# Patient Record
Sex: FEMALE | Race: BLACK OR AFRICAN AMERICAN | NOT HISPANIC OR LATINO
[De-identification: names, ages, dates, MRNs, and addresses within clinical notes are randomized per-mention and may not be internally consistent; named-entity substitution may affect disease eponyms.]

---

## 2020-10-23 PROBLEM — Z00.00 ENCOUNTER FOR PREVENTIVE HEALTH EXAMINATION: Status: ACTIVE | Noted: 2020-10-23

## 2020-10-26 ENCOUNTER — APPOINTMENT (OUTPATIENT)
Dept: PULMONOLOGY | Facility: CLINIC | Age: 70
End: 2020-10-26

## 2021-04-12 ENCOUNTER — APPOINTMENT (OUTPATIENT)
Dept: ORTHOPEDIC SURGERY | Facility: CLINIC | Age: 71
End: 2021-04-12

## 2021-04-19 ENCOUNTER — APPOINTMENT (OUTPATIENT)
Dept: ORTHOPEDIC SURGERY | Facility: CLINIC | Age: 71
End: 2021-04-19

## 2021-06-03 ENCOUNTER — EMERGENCY (EMERGENCY)
Facility: HOSPITAL | Age: 71
LOS: 1 days | Discharge: HOME | End: 2021-06-03
Attending: EMERGENCY MEDICINE | Admitting: EMERGENCY MEDICINE
Payer: COMMERCIAL

## 2021-06-03 VITALS
RESPIRATION RATE: 18 BRPM | HEIGHT: 68 IN | SYSTOLIC BLOOD PRESSURE: 169 MMHG | OXYGEN SATURATION: 97 % | HEART RATE: 77 BPM | TEMPERATURE: 98 F | DIASTOLIC BLOOD PRESSURE: 84 MMHG | WEIGHT: 240.08 LBS

## 2021-06-03 VITALS
DIASTOLIC BLOOD PRESSURE: 61 MMHG | RESPIRATION RATE: 17 BRPM | OXYGEN SATURATION: 95 % | SYSTOLIC BLOOD PRESSURE: 110 MMHG | TEMPERATURE: 98 F | HEART RATE: 70 BPM

## 2021-06-03 DIAGNOSIS — Z95.0 PRESENCE OF CARDIAC PACEMAKER: ICD-10-CM

## 2021-06-03 DIAGNOSIS — I73.9 PERIPHERAL VASCULAR DISEASE, UNSPECIFIED: ICD-10-CM

## 2021-06-03 DIAGNOSIS — Z86.79 PERSONAL HISTORY OF OTHER DISEASES OF THE CIRCULATORY SYSTEM: ICD-10-CM

## 2021-06-03 DIAGNOSIS — R06.02 SHORTNESS OF BREATH: ICD-10-CM

## 2021-06-03 DIAGNOSIS — I25.10 ATHEROSCLEROTIC HEART DISEASE OF NATIVE CORONARY ARTERY WITHOUT ANGINA PECTORIS: ICD-10-CM

## 2021-06-03 DIAGNOSIS — M79.89 OTHER SPECIFIED SOFT TISSUE DISORDERS: ICD-10-CM

## 2021-06-03 DIAGNOSIS — I44.0 ATRIOVENTRICULAR BLOCK, FIRST DEGREE: ICD-10-CM

## 2021-06-03 DIAGNOSIS — E78.5 HYPERLIPIDEMIA, UNSPECIFIED: ICD-10-CM

## 2021-06-03 DIAGNOSIS — E11.9 TYPE 2 DIABETES MELLITUS WITHOUT COMPLICATIONS: ICD-10-CM

## 2021-06-03 DIAGNOSIS — I25.5 ISCHEMIC CARDIOMYOPATHY: ICD-10-CM

## 2021-06-03 DIAGNOSIS — I10 ESSENTIAL (PRIMARY) HYPERTENSION: ICD-10-CM

## 2021-06-03 DIAGNOSIS — I82.409 ACUTE EMBOLISM AND THROMBOSIS OF UNSPECIFIED DEEP VEINS OF UNSPECIFIED LOWER EXTREMITY: ICD-10-CM

## 2021-06-03 LAB
ALBUMIN SERPL ELPH-MCNC: 4 G/DL — SIGNIFICANT CHANGE UP (ref 3.3–5)
ALP SERPL-CCNC: 79 U/L — SIGNIFICANT CHANGE UP (ref 40–120)
ALT FLD-CCNC: 11 U/L — SIGNIFICANT CHANGE UP (ref 10–45)
ANION GAP SERPL CALC-SCNC: 13 MMOL/L — SIGNIFICANT CHANGE UP (ref 5–17)
APTT BLD: 37.6 SEC — HIGH (ref 27.5–35.5)
AST SERPL-CCNC: 11 U/L — SIGNIFICANT CHANGE UP (ref 10–40)
BASOPHILS # BLD AUTO: 0.03 K/UL — SIGNIFICANT CHANGE UP (ref 0–0.2)
BASOPHILS NFR BLD AUTO: 0.5 % — SIGNIFICANT CHANGE UP (ref 0–2)
BILIRUB SERPL-MCNC: 0.4 MG/DL — SIGNIFICANT CHANGE UP (ref 0.2–1.2)
BUN SERPL-MCNC: 16 MG/DL — SIGNIFICANT CHANGE UP (ref 7–23)
CALCIUM SERPL-MCNC: 9 MG/DL — SIGNIFICANT CHANGE UP (ref 8.4–10.5)
CHLORIDE SERPL-SCNC: 101 MMOL/L — SIGNIFICANT CHANGE UP (ref 96–108)
CO2 SERPL-SCNC: 26 MMOL/L — SIGNIFICANT CHANGE UP (ref 22–31)
CREAT SERPL-MCNC: 0.89 MG/DL — SIGNIFICANT CHANGE UP (ref 0.5–1.3)
EOSINOPHIL # BLD AUTO: 0.28 K/UL — SIGNIFICANT CHANGE UP (ref 0–0.5)
EOSINOPHIL NFR BLD AUTO: 4.3 % — SIGNIFICANT CHANGE UP (ref 0–6)
GLUCOSE SERPL-MCNC: 94 MG/DL — SIGNIFICANT CHANGE UP (ref 70–99)
HCT VFR BLD CALC: 35.1 % — SIGNIFICANT CHANGE UP (ref 34.5–45)
HGB BLD-MCNC: 11.8 G/DL — SIGNIFICANT CHANGE UP (ref 11.5–15.5)
IMM GRANULOCYTES NFR BLD AUTO: 0.2 % — SIGNIFICANT CHANGE UP (ref 0–1.5)
LYMPHOCYTES # BLD AUTO: 2.19 K/UL — SIGNIFICANT CHANGE UP (ref 1–3.3)
LYMPHOCYTES # BLD AUTO: 33.7 % — SIGNIFICANT CHANGE UP (ref 13–44)
MCHC RBC-ENTMCNC: 29.9 PG — SIGNIFICANT CHANGE UP (ref 27–34)
MCHC RBC-ENTMCNC: 33.6 GM/DL — SIGNIFICANT CHANGE UP (ref 32–36)
MCV RBC AUTO: 88.9 FL — SIGNIFICANT CHANGE UP (ref 80–100)
MONOCYTES # BLD AUTO: 0.61 K/UL — SIGNIFICANT CHANGE UP (ref 0–0.9)
MONOCYTES NFR BLD AUTO: 9.4 % — SIGNIFICANT CHANGE UP (ref 2–14)
NEUTROPHILS # BLD AUTO: 3.38 K/UL — SIGNIFICANT CHANGE UP (ref 1.8–7.4)
NEUTROPHILS NFR BLD AUTO: 51.9 % — SIGNIFICANT CHANGE UP (ref 43–77)
NRBC # BLD: 0 /100 WBCS — SIGNIFICANT CHANGE UP (ref 0–0)
PLATELET # BLD AUTO: 225 K/UL — SIGNIFICANT CHANGE UP (ref 150–400)
POTASSIUM SERPL-MCNC: 4.7 MMOL/L — SIGNIFICANT CHANGE UP (ref 3.5–5.3)
POTASSIUM SERPL-SCNC: 4.7 MMOL/L — SIGNIFICANT CHANGE UP (ref 3.5–5.3)
PROT SERPL-MCNC: 6.9 G/DL — SIGNIFICANT CHANGE UP (ref 6–8.3)
RBC # BLD: 3.95 M/UL — SIGNIFICANT CHANGE UP (ref 3.8–5.2)
RBC # FLD: 15.9 % — HIGH (ref 10.3–14.5)
SODIUM SERPL-SCNC: 140 MMOL/L — SIGNIFICANT CHANGE UP (ref 135–145)
TROPONIN T SERPL-MCNC: 0.01 NG/ML — SIGNIFICANT CHANGE UP (ref 0–0.01)
WBC # BLD: 6.5 K/UL — SIGNIFICANT CHANGE UP (ref 3.8–10.5)
WBC # FLD AUTO: 6.5 K/UL — SIGNIFICANT CHANGE UP (ref 3.8–10.5)

## 2021-06-03 PROCEDURE — 71045 X-RAY EXAM CHEST 1 VIEW: CPT

## 2021-06-03 PROCEDURE — 96374 THER/PROPH/DIAG INJ IV PUSH: CPT

## 2021-06-03 PROCEDURE — 93010 ELECTROCARDIOGRAM REPORT: CPT

## 2021-06-03 PROCEDURE — 93971 EXTREMITY STUDY: CPT | Mod: 26,RT

## 2021-06-03 PROCEDURE — 85025 COMPLETE CBC W/AUTO DIFF WBC: CPT

## 2021-06-03 PROCEDURE — G1004: CPT

## 2021-06-03 PROCEDURE — 36415 COLL VENOUS BLD VENIPUNCTURE: CPT

## 2021-06-03 PROCEDURE — 96375 TX/PRO/DX INJ NEW DRUG ADDON: CPT

## 2021-06-03 PROCEDURE — 85730 THROMBOPLASTIN TIME PARTIAL: CPT

## 2021-06-03 PROCEDURE — 99284 EMERGENCY DEPT VISIT MOD MDM: CPT | Mod: 25

## 2021-06-03 PROCEDURE — 84484 ASSAY OF TROPONIN QUANT: CPT

## 2021-06-03 PROCEDURE — 99283 EMERGENCY DEPT VISIT LOW MDM: CPT

## 2021-06-03 PROCEDURE — 93005 ELECTROCARDIOGRAM TRACING: CPT

## 2021-06-03 PROCEDURE — 74174 CTA ABD&PLVS W/CONTRAST: CPT | Mod: 26,MG

## 2021-06-03 PROCEDURE — 80053 COMPREHEN METABOLIC PANEL: CPT

## 2021-06-03 PROCEDURE — 93971 EXTREMITY STUDY: CPT

## 2021-06-03 PROCEDURE — 74174 CTA ABD&PLVS W/CONTRAST: CPT

## 2021-06-03 PROCEDURE — 99285 EMERGENCY DEPT VISIT HI MDM: CPT

## 2021-06-03 PROCEDURE — 71045 X-RAY EXAM CHEST 1 VIEW: CPT | Mod: 26

## 2021-06-03 RX ORDER — HEPARIN SODIUM 5000 [USP'U]/ML
INJECTION INTRAVENOUS; SUBCUTANEOUS
Qty: 25000 | Refills: 0 | Status: DISCONTINUED | OUTPATIENT
Start: 2021-06-03 | End: 2021-06-07

## 2021-06-03 RX ORDER — HEPARIN SODIUM 5000 [USP'U]/ML
4000 INJECTION INTRAVENOUS; SUBCUTANEOUS EVERY 6 HOURS
Refills: 0 | Status: DISCONTINUED | OUTPATIENT
Start: 2021-06-03 | End: 2021-06-07

## 2021-06-03 RX ORDER — MORPHINE SULFATE 50 MG/1
2 CAPSULE, EXTENDED RELEASE ORAL ONCE
Refills: 0 | Status: DISCONTINUED | OUTPATIENT
Start: 2021-06-03 | End: 2021-06-03

## 2021-06-03 RX ORDER — HEPARIN SODIUM 5000 [USP'U]/ML
9000 INJECTION INTRAVENOUS; SUBCUTANEOUS EVERY 6 HOURS
Refills: 0 | Status: DISCONTINUED | OUTPATIENT
Start: 2021-06-03 | End: 2021-06-07

## 2021-06-03 RX ORDER — ACETAMINOPHEN 500 MG
1000 TABLET ORAL ONCE
Refills: 0 | Status: COMPLETED | OUTPATIENT
Start: 2021-06-03 | End: 2021-06-03

## 2021-06-03 RX ADMIN — MORPHINE SULFATE 2 MILLIGRAM(S): 50 CAPSULE, EXTENDED RELEASE ORAL at 20:38

## 2021-06-03 RX ADMIN — Medication 1000 MILLIGRAM(S): at 19:08

## 2021-06-03 RX ADMIN — HEPARIN SODIUM 1900 UNIT(S)/HR: 5000 INJECTION INTRAVENOUS; SUBCUTANEOUS at 19:05

## 2021-06-03 NOTE — CONSULT NOTE ADULT - ASSESSMENT
71F w/ PMHx DM2, HTN, HLD, MI, pacemaker, recent NSTEMI s/p PCA to RCA (9/2020), and known RLE DVT (on eliquis, last taken 6pm last night) presents with increased RLE swelling x 2 weeks. 71F w/ PMHx DM2, HTN, HLD, MI, pacemaker, recent NSTEMI s/p PCA to RCA (9/2020), and known RLE DVT (on eliquis, last taken 6pm last night) presents with increased RLE swelling x 2 weeks. Afebrile, HDS. RLE duplex negative for DVT, CT venogram pending.     -please continue with heparin gtt until CT venogram results  -further plans pending CT venogram  71F w/ PMHx DM2, HTN, HLD, MI, pacemaker, recent NSTEMI s/p PCA to RCA (9/2020), and known RLE DVT (on eliquis, last taken 6pm last night) presents with increased RLE swelling x 2 weeks. Afebrile, HDS. RLE duplex negative for DVT, CT venogram pending.     -please continue with heparin gtt until CT venogram results  -further plans pending CT venogram     -Vascular Amendment  -CTV neg for thrombus.  pt can be dc'd on home Eliquis.  -F/u w/ Dr Orellana prn

## 2021-06-03 NOTE — ED ADULT NURSE REASSESSMENT NOTE - NS ED NURSE REASSESS COMMENT FT1
Reports from SARBJIT Ohara. Pt back from ct. Pending results. Lab and US results reviewed. Pt on Heparin drip at 1900 units per hour. Pt reports pain 8 out of 10 with no change post 1,000 mg of po Tylenol. NEREIDA benavides made aware. Reports from SARBJIT Ohara. Pt back from ct. Pending results and vasculature consult. Lab and US results reviewed. Pt on Heparin drip at 1900 units per hour. Pt reports pain 8 out of 10 with no change post 1,000 mg of po Tylenol. NEREIDA Ocasio made aware. Pt updated on plan of care and verbalized understanding.

## 2021-06-03 NOTE — ED PROVIDER NOTE - PATIENT PORTAL LINK FT
You can access the FollowMyHealth Patient Portal offered by [facility] by registering at the following website: [email or web address]. By joining [facility]’s FollowMyHealth portal, you will also be able to view your health information using other applications (apps) compatible with our system.

## 2021-06-03 NOTE — CONSULT NOTE ADULT - SUBJECTIVE AND OBJECTIVE BOX
71F w/ PMHx DM2, HTN, HLD, MI, pacemaker, recent NSTEMI s/p PCA to RCA (9/2020), and known RLE DVT (on eliquis, last taken 6pm last night) presents with increased RLE swelling x 2 weeks. She has noticed that the swelling has been slowly increasing over the past 2 weeks and it has appeared slightly more red than normal. Not remarkably tender. She also reports some mild shortness of breath for the past 2 days that prompted her to contact her cardiologist (Dr. Liz Turner) who told her to come to the ED for further workup. She denies any fevers, chills, CP, dizziness, lightheadedness, dysuria, constipation, diarrhea, no recent falls or trauma to her RLE. Patient reports that she is planned to have surgery for a "blockage" in her heart.     Vital Signs Last 24 Hrs  T(C): 36.9 (03 Jun 2021 13:41), Max: 36.9 (03 Jun 2021 13:41)  T(F): 98.5 (03 Jun 2021 13:41), Max: 98.5 (03 Jun 2021 13:41)  HR: 77 (03 Jun 2021 13:41) (77 - 77)  BP: 169/84 (03 Jun 2021 13:41) (169/84 - 169/84)  BP(mean): --  RR: 18 (03 Jun 2021 13:41) (18 - 18)  SpO2: 97% (03 Jun 2021 13:41) (97% - 97%)    Physical Exam:  General: NAD  Pulmonary: Nonlabored breathing, no respiratory distress  Cardiovascular: regular rate and rhythm, no murmurs   Abdominal: soft, nondistended, nontender with no rebound or guarding  Extremities: WWP, Right leg with increased swelling to the thigh, slightly more erythematous than the left, biphasic DP and PT bilaterally   Neuro: A/O x3    I&O's Summary      LABS:                        11.8   6.50  )-----------( 225      ( 03 Jun 2021 14:33 )             35.1     06-03    140  |  101  |  16  ----------------------------<  94  4.7   |  26  |  0.89    Ca    9.0      03 Jun 2021 14:33    TPro  6.9  /  Alb  4.0  /  TBili  0.4  /  DBili  x   /  AST  11  /  ALT  11  /  AlkPhos  79  06-03        CAPILLARY BLOOD GLUCOSE        LIVER FUNCTIONS - ( 03 Jun 2021 14:33 )  Alb: 4.0 g/dL / Pro: 6.9 g/dL / ALK PHOS: 79 U/L / ALT: 11 U/L / AST: 11 U/L / GGT: x             RADIOLOGY & ADDITIONAL STUDIES:  < from: US Duplex Venous Lower Ext Ltd, Right (06.03.21 @ 18:17) >  FINDINGS:  Limited visualization of some of the veins secondary to body habitus.    There is normal compressibility of the right common femoral, femoral and popliteal veins.  The contralateral common femoral vein is patent.  Doppler examination shows normal spontaneous and phasic flow.    No calf vein thrombosis is detected.    IMPRESSION:  No evidence of right lower extremity deep venous thrombosis.          Thank you for the opportunity to participate in the care of this patient.  ******PRELIMINARY REPORT******    ******PRELIMINARY REPORT******         < end of copied text >

## 2021-06-03 NOTE — ED PROVIDER NOTE - CARE PROVIDER_API CALL
Dmitry Orellana)  [facility] Surgery  Vascular  [address]  [place], [place] [address]  Phone: [phone]  Fax: [phone]  Follow Up Time:

## 2021-06-03 NOTE — ED PROVIDER NOTE - PMH
DVT (deep venous thrombosis)    HLD (hyperlipidemia)    HTN (hypertension)    MI (myocardial infarction)    Pacemaker    T2DM (type 2 diabetes mellitus)

## 2021-06-03 NOTE — ED ADULT NURSE NOTE - INTERVENTIONS DEFINITIONS
Instruct patient to call for assistance/Stretcher in lowest position, wheels locked, appropriate side rails in place/Provide visual cue, wrist band, yellow gown, etc.

## 2021-06-03 NOTE — ED PROVIDER NOTE - OBJECTIVE STATEMENT
72 y/o F PMHx T2dm, htn, hld, MI, pacemaker, dvt. Presents with sob x 2 days and RLE x 1.5 weeks. Sent to ED by Cardiologist Dr. Liz Turner ([phone], [phone]) for evaluation of swelling to the RLE. Pt reports swelling x 2 weeks.  Pt is planned to have surgery for a "blockage" in her heart. Denies falls.    Pt does not have Hx covid and is fully vaccinated.

## 2021-06-03 NOTE — ED PROVIDER NOTE - PROGRESS NOTE DETAILS
case discussed with DR. Turner, he sent pt to the ED to r/o DVT RLE. wants vascular consult with ? Dr. Ordonez.  pt with RCA stent and known stable circ. stenosis spoke with vascular team will start heparin drip. vascular study pending. CTA neg for clot.  Resting comfortably. Vasc surg clear for DC.

## 2021-06-03 NOTE — ED ADULT NURSE NOTE - OBJECTIVE STATEMENT
Pt AOX4. Pt c/o right leg swelling and pain for 2 weeks and worsening SOB with ambulation that started yesterday. Pt reports hx of DVTS and recent pacemaker placement. Right lower extremity swollen and warm to touch. Pt reports pain in right lower extremity when ambulating. Pt denies fevers, chills, n/v, numbness, weakness, tingling, chest pain, dizziness. +2 pedal pulses b/l. Pt speaking in full complete sentences. Respirations even and unlabored.

## 2021-06-03 NOTE — ED PROVIDER NOTE - NSFOLLOWUPINSTRUCTIONS_ED_ALL_ED_FT
Continue taking Eliquis as prescribed.  Follow up with primary care provider in 1-2 days.  Please also follow up with vascular surgeon.  Return to the Emergency Department if you have any new or worsening symptoms, or if you have any concerns.  ==========================    Edema    WHAT YOU NEED TO KNOW:    Edema is swelling throughout your body. Edema is usually a sign that you are retaining fluid. The swelling may be caused by heart failure or kidney, thyroid, or liver disease. It may also be caused by medicines such as antidepressants, blood pressure medicines, or hormones. Sudden swelling around the lips or face may be a sign of a severe allergic reaction. Swelling of an arm or leg may be caused by blockage of your veins.     DISCHARGE INSTRUCTIONS:    Return to the emergency department if:   •You have shortness of breath at rest, especially when you lie down.      •You cough up pink, foamy sputum.       •You have chest pain.      •Your heartbeat is fast or uneven.       Contact your healthcare provider if:   •The swollen area feels cold and is pale or blue in color.      •The swollen area feels warm, painful, and is red in color.      •You have increased swelling or swelling in other parts of your body.      •You have questions or concerns about your condition or care.      Medicines:   •Medicines help to get rid of extra body fluid.       •Take your medicine as directed. Contact your healthcare provider if you think your medicine is not helping or if you have side effects. Tell him or her if you are allergic to any medicine. Keep a list of the medicines, vitamins, and herbs you take. Include the amounts, and when and why you take them. Bring the list or the pill bottles to follow-up visits. Carry your medicine list with you in case of an emergency.      Follow up with your healthcare provider as directed: Write down your questions so you remember to ask them during your visits.     Manage edema:   •Elevate your arms or legs as directed. Raise them above the level of your heart as often as you can. This will help decrease swelling and pain. Prop them on pillows or blankets to keep them elevated comfortably.      •Wear pressure stockings as directed. The stockings are tight and put pressure on your legs. This helps to keep fluid from collecting in your legs or ankles.       •Limit your salt intake. Salt causes your body to hold water. Ask about any other changes to your diet.      •Stay active. Do not stand or sit for long periods of time. Ask your healthcare provider about the best exercise plan for you.      •Keep your skin moist using lotion, cream, or ointment. Ask your healthcare provider what to use and how often to use it.

## 2021-06-03 NOTE — ED PROVIDER NOTE - MUSCULOSKELETAL, MLM
Spine appears normal, range of motion is not limited, no muscle or joint tenderness. Right leg is warm and swollen compared to the left. 2+ pitting edema. Palpable posterior tibial pulse on the right foot.

## 2021-06-07 VITALS
HEART RATE: 68 BPM | DIASTOLIC BLOOD PRESSURE: 61 MMHG | OXYGEN SATURATION: 94 % | TEMPERATURE: 98 F | RESPIRATION RATE: 18 BRPM | HEIGHT: 68 IN | SYSTOLIC BLOOD PRESSURE: 135 MMHG | WEIGHT: 220.02 LBS

## 2021-06-07 RX ORDER — CHLORHEXIDINE GLUCONATE 213 G/1000ML
1 SOLUTION TOPICAL ONCE
Refills: 0 | Status: DISCONTINUED | OUTPATIENT
Start: 2021-06-09 | End: 2021-06-10

## 2021-06-07 NOTE — H&P ADULT - NSICDXPASTMEDICALHX_GEN_ALL_CORE_FT
PAST MEDICAL HISTORY:  CAD (coronary artery disease)     DVT (deep venous thrombosis)     HLD (hyperlipidemia)     HTN (hypertension)     Ischemic cardiomyopathy     MI (myocardial infarction)     Non-ST elevation MI (NSTEMI)     Pacemaker     PVD (peripheral vascular disease)     T2DM (type 2 diabetes mellitus)

## 2021-06-07 NOTE — H&P ADULT - ADDITIONAL PE
EKG:  ASA: III  Mallampati: II EKG: NSR @ 61 bpm, no acute ST-T wave changes.   ASA: III  Mallampati: II

## 2021-06-07 NOTE — H&P ADULT - HISTORY OF PRESENT ILLNESS
COVID:    70yo Female, former smoker, w/ PMH HTN, HLD, CAD c/b NSTEMI 9/11/20 s/p PCI (NADIA mRCA with residual OM3 80-90%), ICMP (now recovered EF from 35% to 55%), s/p PPM 2/2 ___, prior hx of mod MR now mild, DM-II, PVD and OA, who initially presented to her cardiologist Dr. Liz Turner c/o gradually worsening intermittent substernal, non radiating, chest pressure occurring w/ mod exertion and relieved w/ rest over the past few weeks. Pt also reports MELO w/ light physical exertion and B/L moderate calf pain/heaviness over the past few years that improves w/ rest. She denies any ___ palpitations, dizziness, syncope, diaphoresis, fatigue, LE edema, orthopnea, PND, N/V, fever, chills or recent sick contact. Most recent Cardiac Cath (9/11/20 at [facility]) revealed LM normal, mRCA 80-90% s/p NADIA, dRCA mild diffuse, pLAD mild diffuse, mLAD <30%, dLCx mild diffuse, OM3 80-90%. Pt previously advised for staged PCI of LCx, however has been postponed 2/2 frequent admissions for bradycardia/diarrhea/CHF exacerbation. Echo (2/9/21) EF 55%, mild MR, mild TR  In light of pts risk factors, CCS class __ anginal symptoms and known residual CAD, pt now presents to [facility] for staged PCI. Cardiologist: Dr. Liz Turner   COVID: vaccinated with Moderna, last vaccine 04/07/2021  Pharmacy: [facility] [phone]  Escort: Friend     72yo Female, former smoker, w/ PMH HTN, HLD, CAD c/b NSTEMI 9/11/20 s/p PCI (NADIA mRCA with residual OM3 80-90%), ICMP (now recovered EF from 35% to 55%), s/p PPM 2/2 bradycardia last interrogated: 05/2021, prior hx of mod MR now mild, DM-II, PVD and OA, who initially presented to her cardiologist Dr. Liz Turner c/o gradually worsening intermittent substernal, non radiating, chest pressure occurring w/ mod exertion and relieved w/ rest over the past few weeks. Pt also reports MELO w/ light physical exertion and B/L moderate calf pain/heaviness over the past few years that improves w/ rest. She denies any palpitations, dizziness, syncope, diaphoresis, fatigue, LE edema, orthopnea, PND, N/V, fever, chills or recent sick contact. Most recent Cardiac Cath (9/11/20 at [facility]) revealed LM normal, mRCA 80-90% s/p NADIA, dRCA mild diffuse, pLAD mild diffuse, mLAD <30%, dLCx mild diffuse, OM3 80-90%. Pt previously advised for staged PCI of LCx, however has been postponed 2/2 frequent admissions for bradycardia/diarrhea/CHF exacerbation. Echo (2/9/21) EF 55%, mild MR, mild TR  In light of pts risk factors, CCS class III anginal symptoms and known residual CAD, pt now presents to [facility] for staged PCI. Cardiologist: Dr. Liz Turner   COVID: vaccinated with Moderna, last vaccine 04/07/2021  Pharmacy: [facility] [phone]  Escort: Friend     70yo Female, former smoker, w/ PMH HTN, HLD, CAD c/b NSTEMI 9/11/20 s/p PCI (NADIA mRCA with residual OM3 80-90%), ICMP (now recovered EF from 35% to 55%), s/p PPM 2/2 bradycardia last interrogated: 05/2021, prior hx of mod MR now mild, DM-II, PVD OA, recent DVT 09/2020 - on Xarelto) who initially presented to her cardiologist Dr. Liz Turner c/o gradually worsening intermittent substernal, non radiating, chest pressure occurring w/ mod exertion and relieved w/ rest over the past few weeks. Pt also reports MELO w/ light physical exertion and B/L moderate calf pain/heaviness over the past few years that improves w/ rest. She denies any palpitations, dizziness, syncope, diaphoresis, fatigue, LE edema, orthopnea, PND, N/V, fever, chills or recent sick contact. Most recent Cardiac Cath (9/11/20 at [facility]) revealed LM normal, mRCA 80-90% s/p NADIA, dRCA mild diffuse, pLAD mild diffuse, mLAD <30%, dLCx mild diffuse, OM3 80-90%. Pt previously advised for staged PCI of LCx, however has been postponed 2/2 frequent admissions for bradycardia/diarrhea/CHF exacerbation. Echo (2/9/21) EF 55%, mild MR, mild TR  In light of pts risk factors, CCS class III anginal symptoms and known residual CAD, pt now presents to [facility] for staged PCI.

## 2021-06-07 NOTE — H&P ADULT - ASSESSMENT
70yo Female, former smoker, w/ PMH HTN, HLD, CAD c/b NSTEMI 9/11/20 s/p PCI (NADIA mRCA with residual OM3 80-90%), ICMP (now recovered EF from 35% to 55%), s/p PPM 2/2 bradycardia last interrogated: 05/2021, prior hx of mod MR now mild, DM-II, PVD and OA, who presents for staged PCI given patient's risk factors, CCS class III anginal symptoms and known residual CAD.      - EKG: NSR @      - ASA:  III      Mallampati: III  - H/H stable: 12.1/36.3       Platelets/Coags stable. Cr: 0.89  - Patient denies hematochieza, hematuria, easy bruising, or signs of bleeding.   - Patient loaded with Plavix 600 mg PO x 1 and took Aspirin 81 mg PO x 1 this morning.   - Patient started on IV NS @ 50 cc/hr x 4 hours.   - Patient with hx of DM, on Januvia and Metformin at home. Started on: MISS.   - Patient is a suitable candidate for moderate sedation.     Risks & benefits of procedure and alternative therapy have been explained to the patient including but not limited to: allergic reaction, bleeding w/possible need for blood transfusion, infection, renal and vascular compromise, limb damage, arrhythmia, stroke, vessel dissection/perforation, Myocardial infarction, emergent CABG. Informed consent obtained and in chart.    70yo Female, former smoker, w/ PMH HTN, HLD, CAD c/b NSTEMI 9/11/20 s/p PCI (NADIA mRCA with residual OM3 80-90%), ICMP (now recovered EF from 35% to 55%), s/p PPM 2/2 bradycardia last interrogated: 05/2021, prior hx of mod MR now mild, DM-II, PVD and OA, who presents for staged PCI given patient's risk factors, CCS class III anginal symptoms and known residual CAD.      - EKG: NSR @ 61 bpm, no acute ST-T wave changes.     - ASA:  III      Mallampati: III  - H/H stable: 12.1/36.3       Platelets/Coags stable. Cr: 0.89  - Patient denies hematochieza, hematuria, easy bruising, or signs of bleeding.   - Patient loaded with Plavix 600 mg PO x 1 and took Aspirin 81 mg PO x 1 this morning.   - Patient started on IV NS @ 50 cc/hr x 4 hours.   - Patient with hx of DM, on Januvia and Metformin at home. Started on: MISS.   - Patient is a suitable candidate for moderate sedation.     Risks & benefits of procedure and alternative therapy have been explained to the patient including but not limited to: allergic reaction, bleeding w/possible need for blood transfusion, infection, renal and vascular compromise, limb damage, arrhythmia, stroke, vessel dissection/perforation, Myocardial infarction, emergent CABG. Informed consent obtained and in chart.

## 2021-06-09 ENCOUNTER — INPATIENT (INPATIENT)
Facility: HOSPITAL | Age: 71
LOS: 0 days | Discharge: HOME | DRG: 247 | End: 2021-06-10
Attending: INTERNAL MEDICINE | Admitting: INTERNAL MEDICINE
Payer: COMMERCIAL

## 2021-06-09 ENCOUNTER — TRANSCRIPTION ENCOUNTER (OUTPATIENT)
Age: 71
End: 2021-06-09

## 2021-06-09 DIAGNOSIS — Z79.82 LONG TERM (CURRENT) USE OF ASPIRIN: ICD-10-CM

## 2021-06-09 DIAGNOSIS — E78.5 HYPERLIPIDEMIA, UNSPECIFIED: ICD-10-CM

## 2021-06-09 DIAGNOSIS — I25.110 ATHEROSCLEROTIC HEART DISEASE OF NATIVE CORONARY ARTERY WITH UNSTABLE ANGINA PECTORIS: ICD-10-CM

## 2021-06-09 DIAGNOSIS — I25.2 OLD MYOCARDIAL INFARCTION: ICD-10-CM

## 2021-06-09 DIAGNOSIS — Z86.718 PERSONAL HISTORY OF OTHER VENOUS THROMBOSIS AND EMBOLISM: ICD-10-CM

## 2021-06-09 DIAGNOSIS — Z79.01 LONG TERM (CURRENT) USE OF ANTICOAGULANTS: ICD-10-CM

## 2021-06-09 DIAGNOSIS — I50.9 HEART FAILURE, UNSPECIFIED: ICD-10-CM

## 2021-06-09 DIAGNOSIS — Z95.5 PRESENCE OF CORONARY ANGIOPLASTY IMPLANT AND GRAFT: ICD-10-CM

## 2021-06-09 DIAGNOSIS — I11.0 HYPERTENSIVE HEART DISEASE WITH HEART FAILURE: ICD-10-CM

## 2021-06-09 DIAGNOSIS — Z87.891 PERSONAL HISTORY OF NICOTINE DEPENDENCE: ICD-10-CM

## 2021-06-09 DIAGNOSIS — Z95.0 PRESENCE OF CARDIAC PACEMAKER: ICD-10-CM

## 2021-06-09 DIAGNOSIS — Z79.84 LONG TERM (CURRENT) USE OF ORAL HYPOGLYCEMIC DRUGS: ICD-10-CM

## 2021-06-09 DIAGNOSIS — E83.42 HYPOMAGNESEMIA: ICD-10-CM

## 2021-06-09 DIAGNOSIS — E11.51 TYPE 2 DIABETES MELLITUS WITH DIABETIC PERIPHERAL ANGIOPATHY WITHOUT GANGRENE: ICD-10-CM

## 2021-06-09 DIAGNOSIS — I25.5 ISCHEMIC CARDIOMYOPATHY: ICD-10-CM

## 2021-06-09 LAB
A1C WITH ESTIMATED AVERAGE GLUCOSE RESULT: 6.1 % — HIGH (ref 4–5.6)
ALBUMIN SERPL ELPH-MCNC: 3.9 G/DL — SIGNIFICANT CHANGE UP (ref 3.3–5)
ALP SERPL-CCNC: 76 U/L — SIGNIFICANT CHANGE UP (ref 40–120)
ALT FLD-CCNC: 7 U/L — LOW (ref 10–45)
ANION GAP SERPL CALC-SCNC: 11 MMOL/L — SIGNIFICANT CHANGE UP (ref 5–17)
AST SERPL-CCNC: 10 U/L — SIGNIFICANT CHANGE UP (ref 10–40)
BASOPHILS # BLD AUTO: 0.04 K/UL — SIGNIFICANT CHANGE UP (ref 0–0.2)
BASOPHILS NFR BLD AUTO: 0.6 % — SIGNIFICANT CHANGE UP (ref 0–2)
BILIRUB SERPL-MCNC: 0.5 MG/DL — SIGNIFICANT CHANGE UP (ref 0.2–1.2)
BUN SERPL-MCNC: 11 MG/DL — SIGNIFICANT CHANGE UP (ref 7–23)
CALCIUM SERPL-MCNC: 9.1 MG/DL — SIGNIFICANT CHANGE UP (ref 8.4–10.5)
CHLORIDE SERPL-SCNC: 104 MMOL/L — SIGNIFICANT CHANGE UP (ref 96–108)
CHOLEST SERPL-MCNC: 167 MG/DL — SIGNIFICANT CHANGE UP
CK MB CFR SERPL CALC: 1.2 NG/ML — SIGNIFICANT CHANGE UP (ref 0–6.7)
CK SERPL-CCNC: 75 U/L — SIGNIFICANT CHANGE UP (ref 25–170)
CO2 SERPL-SCNC: 27 MMOL/L — SIGNIFICANT CHANGE UP (ref 22–31)
CREAT SERPL-MCNC: 0.82 MG/DL — SIGNIFICANT CHANGE UP (ref 0.5–1.3)
EOSINOPHIL # BLD AUTO: 0.24 K/UL — SIGNIFICANT CHANGE UP (ref 0–0.5)
EOSINOPHIL NFR BLD AUTO: 3.6 % — SIGNIFICANT CHANGE UP (ref 0–6)
ESTIMATED AVERAGE GLUCOSE: 128 MG/DL — HIGH (ref 68–114)
GLUCOSE BLDC GLUCOMTR-MCNC: 107 MG/DL — HIGH (ref 70–99)
GLUCOSE BLDC GLUCOMTR-MCNC: 89 MG/DL — SIGNIFICANT CHANGE UP (ref 70–99)
GLUCOSE BLDC GLUCOMTR-MCNC: 96 MG/DL — SIGNIFICANT CHANGE UP (ref 70–99)
GLUCOSE SERPL-MCNC: 95 MG/DL — SIGNIFICANT CHANGE UP (ref 70–99)
HCT VFR BLD CALC: 36.3 % — SIGNIFICANT CHANGE UP (ref 34.5–45)
HDLC SERPL-MCNC: 56 MG/DL — SIGNIFICANT CHANGE UP
HGB BLD-MCNC: 12.1 G/DL — SIGNIFICANT CHANGE UP (ref 11.5–15.5)
IMM GRANULOCYTES NFR BLD AUTO: 0.2 % — SIGNIFICANT CHANGE UP (ref 0–1.5)
LIPID PNL WITH DIRECT LDL SERPL: 94 MG/DL — SIGNIFICANT CHANGE UP
LYMPHOCYTES # BLD AUTO: 2.39 K/UL — SIGNIFICANT CHANGE UP (ref 1–3.3)
LYMPHOCYTES # BLD AUTO: 36.2 % — SIGNIFICANT CHANGE UP (ref 13–44)
MCHC RBC-ENTMCNC: 29.7 PG — SIGNIFICANT CHANGE UP (ref 27–34)
MCHC RBC-ENTMCNC: 33.3 GM/DL — SIGNIFICANT CHANGE UP (ref 32–36)
MCV RBC AUTO: 89.2 FL — SIGNIFICANT CHANGE UP (ref 80–100)
MONOCYTES # BLD AUTO: 0.59 K/UL — SIGNIFICANT CHANGE UP (ref 0–0.9)
MONOCYTES NFR BLD AUTO: 8.9 % — SIGNIFICANT CHANGE UP (ref 2–14)
NEUTROPHILS # BLD AUTO: 3.34 K/UL — SIGNIFICANT CHANGE UP (ref 1.8–7.4)
NEUTROPHILS NFR BLD AUTO: 50.5 % — SIGNIFICANT CHANGE UP (ref 43–77)
NON HDL CHOLESTEROL: 111 MG/DL — SIGNIFICANT CHANGE UP
NRBC # BLD: 0 /100 WBCS — SIGNIFICANT CHANGE UP (ref 0–0)
PLATELET # BLD AUTO: 206 K/UL — SIGNIFICANT CHANGE UP (ref 150–400)
POTASSIUM SERPL-MCNC: 4.4 MMOL/L — SIGNIFICANT CHANGE UP (ref 3.5–5.3)
POTASSIUM SERPL-SCNC: 4.4 MMOL/L — SIGNIFICANT CHANGE UP (ref 3.5–5.3)
PROT SERPL-MCNC: 6.8 G/DL — SIGNIFICANT CHANGE UP (ref 6–8.3)
RBC # BLD: 4.07 M/UL — SIGNIFICANT CHANGE UP (ref 3.8–5.2)
RBC # FLD: 15.6 % — HIGH (ref 10.3–14.5)
SODIUM SERPL-SCNC: 142 MMOL/L — SIGNIFICANT CHANGE UP (ref 135–145)
TRIGL SERPL-MCNC: 85 MG/DL — SIGNIFICANT CHANGE UP
WBC # BLD: 6.61 K/UL — SIGNIFICANT CHANGE UP (ref 3.8–10.5)
WBC # FLD AUTO: 6.61 K/UL — SIGNIFICANT CHANGE UP (ref 3.8–10.5)

## 2021-06-09 PROCEDURE — 93010 ELECTROCARDIOGRAM REPORT: CPT | Mod: 76

## 2021-06-09 RX ORDER — INSULIN LISPRO 100/ML
VIAL (ML) SUBCUTANEOUS
Refills: 0 | Status: DISCONTINUED | OUTPATIENT
Start: 2021-06-09 | End: 2021-06-10

## 2021-06-09 RX ORDER — SODIUM CHLORIDE 9 MG/ML
1000 INJECTION, SOLUTION INTRAVENOUS
Refills: 0 | Status: DISCONTINUED | OUTPATIENT
Start: 2021-06-09 | End: 2021-06-10

## 2021-06-09 RX ORDER — ALBUTEROL 90 UG/1
1 AEROSOL, METERED ORAL ONCE
Refills: 0 | Status: DISCONTINUED | OUTPATIENT
Start: 2021-06-09 | End: 2021-06-10

## 2021-06-09 RX ORDER — ATORVASTATIN CALCIUM 80 MG/1
40 TABLET, FILM COATED ORAL AT BEDTIME
Refills: 0 | Status: DISCONTINUED | OUTPATIENT
Start: 2021-06-09 | End: 2021-06-10

## 2021-06-09 RX ORDER — DEXTROSE 50 % IN WATER 50 %
15 SYRINGE (ML) INTRAVENOUS ONCE
Refills: 0 | Status: DISCONTINUED | OUTPATIENT
Start: 2021-06-09 | End: 2021-06-10

## 2021-06-09 RX ORDER — ALBUTEROL 90 UG/1
2 AEROSOL, METERED ORAL
Qty: 0 | Refills: 0 | DISCHARGE

## 2021-06-09 RX ORDER — DEXTROSE 50 % IN WATER 50 %
25 SYRINGE (ML) INTRAVENOUS ONCE
Refills: 0 | Status: DISCONTINUED | OUTPATIENT
Start: 2021-06-09 | End: 2021-06-10

## 2021-06-09 RX ORDER — DEXTROSE 50 % IN WATER 50 %
25 SYRINGE (ML) INTRAVENOUS ONCE
Refills: 0 | Status: DISCONTINUED | OUTPATIENT
Start: 2021-06-09 | End: 2021-06-09

## 2021-06-09 RX ORDER — RIVAROXABAN 15 MG-20MG
1 KIT ORAL
Qty: 0 | Refills: 0 | DISCHARGE

## 2021-06-09 RX ORDER — SACUBITRIL AND VALSARTAN 24; 26 MG/1; MG/1
1 TABLET, FILM COATED ORAL
Qty: 0 | Refills: 0 | DISCHARGE

## 2021-06-09 RX ORDER — SODIUM CHLORIDE 9 MG/ML
500 INJECTION INTRAMUSCULAR; INTRAVENOUS; SUBCUTANEOUS
Refills: 0 | Status: DISCONTINUED | OUTPATIENT
Start: 2021-06-09 | End: 2021-06-10

## 2021-06-09 RX ORDER — SODIUM CHLORIDE 9 MG/ML
1000 INJECTION, SOLUTION INTRAVENOUS
Refills: 0 | Status: DISCONTINUED | OUTPATIENT
Start: 2021-06-09 | End: 2021-06-09

## 2021-06-09 RX ORDER — INSULIN LISPRO 100/ML
VIAL (ML) SUBCUTANEOUS ONCE
Refills: 0 | Status: DISCONTINUED | OUTPATIENT
Start: 2021-06-09 | End: 2021-06-09

## 2021-06-09 RX ORDER — GABAPENTIN 400 MG/1
1 CAPSULE ORAL
Qty: 0 | Refills: 0 | DISCHARGE

## 2021-06-09 RX ORDER — CARVEDILOL PHOSPHATE 80 MG/1
12.5 CAPSULE, EXTENDED RELEASE ORAL EVERY 12 HOURS
Refills: 0 | Status: DISCONTINUED | OUTPATIENT
Start: 2021-06-09 | End: 2021-06-10

## 2021-06-09 RX ORDER — GLUCAGON INJECTION, SOLUTION 0.5 MG/.1ML
1 INJECTION, SOLUTION SUBCUTANEOUS ONCE
Refills: 0 | Status: DISCONTINUED | OUTPATIENT
Start: 2021-06-09 | End: 2021-06-09

## 2021-06-09 RX ORDER — SODIUM CHLORIDE 9 MG/ML
1000 INJECTION INTRAMUSCULAR; INTRAVENOUS; SUBCUTANEOUS
Refills: 0 | Status: DISCONTINUED | OUTPATIENT
Start: 2021-06-09 | End: 2021-06-10

## 2021-06-09 RX ORDER — GABAPENTIN 400 MG/1
600 CAPSULE ORAL THREE TIMES A DAY
Refills: 0 | Status: DISCONTINUED | OUTPATIENT
Start: 2021-06-09 | End: 2021-06-10

## 2021-06-09 RX ORDER — DEXTROSE 50 % IN WATER 50 %
12.5 SYRINGE (ML) INTRAVENOUS ONCE
Refills: 0 | Status: DISCONTINUED | OUTPATIENT
Start: 2021-06-09 | End: 2021-06-09

## 2021-06-09 RX ORDER — CLOPIDOGREL BISULFATE 75 MG/1
600 TABLET, FILM COATED ORAL ONCE
Refills: 0 | Status: COMPLETED | OUTPATIENT
Start: 2021-06-09 | End: 2021-06-09

## 2021-06-09 RX ORDER — METFORMIN HYDROCHLORIDE 850 MG/1
1 TABLET ORAL
Qty: 0 | Refills: 0 | DISCHARGE

## 2021-06-09 RX ORDER — CLOPIDOGREL BISULFATE 75 MG/1
75 TABLET, FILM COATED ORAL DAILY
Refills: 0 | Status: DISCONTINUED | OUTPATIENT
Start: 2021-06-10 | End: 2021-06-10

## 2021-06-09 RX ORDER — DEXTROSE 50 % IN WATER 50 %
12.5 SYRINGE (ML) INTRAVENOUS ONCE
Refills: 0 | Status: DISCONTINUED | OUTPATIENT
Start: 2021-06-09 | End: 2021-06-10

## 2021-06-09 RX ORDER — GLUCAGON INJECTION, SOLUTION 0.5 MG/.1ML
1 INJECTION, SOLUTION SUBCUTANEOUS ONCE
Refills: 0 | Status: DISCONTINUED | OUTPATIENT
Start: 2021-06-09 | End: 2021-06-10

## 2021-06-09 RX ORDER — DEXTROSE 50 % IN WATER 50 %
15 SYRINGE (ML) INTRAVENOUS ONCE
Refills: 0 | Status: DISCONTINUED | OUTPATIENT
Start: 2021-06-09 | End: 2021-06-09

## 2021-06-09 RX ORDER — RIVAROXABAN 15 MG-20MG
20 KIT ORAL
Refills: 0 | Status: DISCONTINUED | OUTPATIENT
Start: 2021-06-10 | End: 2021-06-10

## 2021-06-09 RX ORDER — ACETAMINOPHEN 500 MG
650 TABLET ORAL ONCE
Refills: 0 | Status: COMPLETED | OUTPATIENT
Start: 2021-06-09 | End: 2021-06-09

## 2021-06-09 RX ADMIN — Medication 650 MILLIGRAM(S): at 21:42

## 2021-06-09 RX ADMIN — GABAPENTIN 600 MILLIGRAM(S): 400 CAPSULE ORAL at 21:42

## 2021-06-09 RX ADMIN — ATORVASTATIN CALCIUM 40 MILLIGRAM(S): 80 TABLET, FILM COATED ORAL at 21:42

## 2021-06-09 RX ADMIN — SODIUM CHLORIDE 75 MILLILITER(S): 9 INJECTION INTRAMUSCULAR; INTRAVENOUS; SUBCUTANEOUS at 17:31

## 2021-06-09 RX ADMIN — Medication 650 MILLIGRAM(S): at 22:41

## 2021-06-09 RX ADMIN — CLOPIDOGREL BISULFATE 600 MILLIGRAM(S): 75 TABLET, FILM COATED ORAL at 15:45

## 2021-06-09 RX ADMIN — SODIUM CHLORIDE 75 MILLILITER(S): 9 INJECTION INTRAMUSCULAR; INTRAVENOUS; SUBCUTANEOUS at 17:30

## 2021-06-09 NOTE — DISCHARGE NOTE PROVIDER - CARE PROVIDER_API CALL
Liz Turner)  Cardiovascular Medicine  [address], [address]  [place] [address]  Phone: [phone]  Fax: [phone]  Follow Up Time: 2 weeks

## 2021-06-09 NOTE — DISCHARGE NOTE PROVIDER - NSDCCPCAREPLAN_GEN_ALL_CORE_FT
PRINCIPAL DISCHARGE DIAGNOSIS  Diagnosis: CAD (coronary artery disease)  Assessment and Plan of Treatment: You have a diagnosis of coronary artery disease and received a stent to your distal left circumflex coronary artery.  You have been started on Plavix 75 mg daily and Xarelto 20 mg daily. You MUST continue taking the daily Plavix and Xarelto to ensure your stent does not close. DO NOT STOP THESE MEDICATIONS FOR ANY REASON UNLESS OTHERWISE INDICATED BY YOUR CARDIOLOGIST BECAUSE THIS WILL PUT YOU AT RISK FOR A HEART ATTACK. You should refrain from strenuous activity and heavy lifting for 1 week. Please make a follow up appointment with your cardiologist within 1-2 weeks of your discharge. All of your prescriptions have been sent electronically to your pharmacy.  The catheter from your wrist was removed and bleeding was stopped by manual pressure.  Wash the site daily with soap and water.  There is no need to put on a bandage.      Call the Interventional Cardiology and Vascular Team at [phone] or [phone] if any of following occur pertaining to your vascular access site: Bleeding or hematoma formation (collection of blood under the skin), drainage or redness at the puncture site, numbness, decrease in strength, coolness or pale coloration of skin of the leg or hand.       PRINCIPAL DISCHARGE DIAGNOSIS  Diagnosis: CAD (coronary artery disease)  Assessment and Plan of Treatment: You have a diagnosis of coronary artery disease and received a stent to your distal left circumflex coronary artery.    --Typically, Aspirin and Plavix is given post stenting to keep your Stent open, however, since you are already on Xarelto, it is advised that you DO NOT NEED TO TAKE YOUR ASA AND TAKE PLAVIX AND XARELTO ONLY TO PREVENT INCREASED BLEEDING RISK..       --YOU HAVE BEEN STARTED ON PLAVIX 75MG DAILY TO KEEP YOUR STENT OPEN  -- DO NOT STOP THESE MEDICATIONS FOR ANY REASON UNLESS OTHERWISE INDICATED BY YOUR CARDIOLOGIST BECAUSE THIS WILL PUT YOU AT RISK FOR A HEART ATTACK. You should refrain from strenuous activity and heavy lifting for 1 week. Please make a follow up appointment with your cardiologist, Dr. Turner within one weeks of your discharge. All of your prescriptions have been sent electronically to your pharmacy.  The catheter from your wrist was removed and bleeding was stopped by manual pressure.  Wash the site daily with soap and water.  There is no need to put on a bandage.      Call the Interventional Cardiology and Vascular Team at [phone] or [phone] if any of following occur pertaining to your vascular access site: Bleeding or hematoma formation (collection of blood under the skin), drainage or redness at the puncture site, numbness, decrease in strength, coolness or pale coloration of skin of the leg or hand.      SECONDARY DISCHARGE DIAGNOSES  Diagnosis: HTN (hypertension)  Assessment and Plan of Treatment: You have a history of elevated blood pressure and you should continue your blood pressure medications as prescribed.  --Continue Carvedilol (Coreg) 12.5mg two times daily      Diagnosis: HLD (hyperlipidemia)  Assessment and Plan of Treatment: Please continue your daily statin to ensure your cardiac stent remains open.  --Continue Atorvastatin 40mg daily      Diagnosis: History of DVT (deep vein thrombosis)  Assessment and Plan of Treatment: Have a history of a DVT, restart Xarelto 20mg daily with dinner    Diagnosis: DM (diabetes mellitus)  Assessment and Plan of Treatment: You are a diabetic and was found with an Hemoglobin A1c 6.1.     DO NOT TAKE your Metformin for two days. This medication can interact with the contrast used during your procedure therefore we want to ensure the contrast has left your body prior to you restarting your Metformin.   Make sure you monitor your finger sticks and diet while you are not taking your Metformin!  PLEASE HOLD AND RESTART METFORMIN ON SATURDAY, 6/12/21

## 2021-06-09 NOTE — DISCHARGE NOTE PROVIDER - NSDCMRMEDTOKEN_GEN_ALL_CORE_FT
Aspirin Enteric Coated 81 mg oral delayed release tablet: 1 tab(s) orally once a day  carvedilol 12.5 mg oral tablet: 1 tab(s) orally 2 times a day  gabapentin 600 mg oral tablet: 1 tab(s) orally 3 times a day  Lipitor 40 mg oral tablet: 1 tab(s) orally once a day  metFORMIN 500 mg oral tablet: 1 tab(s) orally 2 times a day  Ventolin HFA 90 mcg/inh inhalation aerosol: 2 puff(s) inhaled every 6 hours  Xarelto 20 mg oral tablet: 1 tab(s) orally once a day (in the evening)   gabapentin 600 mg oral tablet: 1 tab(s) orally 3 times a day  metFORMIN 500 mg oral tablet: 1 tab(s) orally 2 times a day  Ventolin HFA 90 mcg/inh inhalation aerosol: 2 puff(s) inhaled every 6 hours  Xarelto 20 mg oral tablet: 1 tab(s) orally once a day (in the evening)   carvedilol 12.5 mg oral tablet: 1 tab(s) orally 2 times a day  clopidogrel 75 mg oral tablet: 1 tab(s) orally once a day  gabapentin 600 mg oral tablet: 1 tab(s) orally 3 times a day  Lipitor 40 mg oral tablet: 1 tab(s) orally once a day  metFORMIN 500 mg oral tablet: 1 tab(s) orally 2 times a day  Ventolin HFA 90 mcg/inh inhalation aerosol: 2 puff(s) inhaled every 6 hours  Xarelto 20 mg oral tablet: 1 tab(s) orally once a day (in the evening)

## 2021-06-09 NOTE — DISCHARGE NOTE PROVIDER - HOSPITAL COURSE
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 Liz Turner, c/o gradually worsening intermittent substernal, non-radiating, chest pressure occurring w/ moderate exertion and relieved w/ rest over the past few weeks. Patient also reports MELO w/ light physical exertion and B/L moderate calf pain/heaviness over the past few years that improves w/ rest. She denied any palpitations, dizziness, syncope, diaphoresis, fatigue, LE edema, orthopnea, PND, nausea/vomiting, fever, chills or recent sick contact. Most recent Cardiac Cath (09/11/2020 at [facility]) revealed LM normal, mid RCA 80-90% s/p NADIA, distal RCA mild diffuse, prox LAD mild diffuse, mid LAD <30%, distal LCx mild diffuse, OM3 80-90%. Patient previously advised for staged PCI of LCx, however had been  postponed due to frequent admissions for bradycardia/diarrhea/CHF exacerbation. Echocardiogram (02/09/2021) showed EF 55%, mild MR, and mild TR. In light of patient's risk factors, CCS class III anginal symptoms, and known residual CAD, patient presented to [facility] for staged PCI.    Patient is now s/p cardiac catheterization w/ PTCA/NADIA distal LCx. Right ulnar access was utilized and ulnar band was eventually removed appropriately and w/o complications. Repeat EKG was w/o ischemic changes and no significant events were noted overnight on telemetry. Labs were reviewed and remained stable. Patient has now been medically cleared for discharge as per Dr. Corrigan. Patient has been given appropriate discharge instructions including medication regimen, access site management, and follow up. Prescriptions the patient requires refills on have been e-prescribed to patient's preferred pharmacy.     VS Stable   Gen: NAD, A&O x3  Cards: RRR, clear S1 and S2 without murmur  Pulm: CTA B/L without w/r/r  Right Ulnar: No hematoma or ooze, peripheral pulses 2+ B/L  Abd: soft, NT  Ext: no LE edema or ulcerations B/L           Cardiac Rehab (Post PCI):            - Education on benefits of Cardiac Rehab provided to patient: Yes         - Referral and Prescription Given for Cardiac Rehab: Yes         - Patient given list of locations & instructed to contact their insurance company to review list of participating providers    Statin Prescribed (PCI this admission): Yes   DAPT: Plavix/Xarelto e-prescribed to patient's preferred pharmacy     INCOMPLETE     70 y/o female, former smoker, w/ PMHx HTN, HLD, CAD (c/b NSTEMI 09/11/2020 s/p PCI w/ NADIA mRCA w/ residual OM3 80-90%), Ischemic cardiomyopathy (now recovered EF from 35% to 55%), 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 Liz Turner, c/o gradually worsening intermittent substernal, non-radiating, chest pressure occurring w/ moderate exertion and relieved w/ rest over the past few weeks. Patient also reports MELO w/ light physical exertion and B/L moderate calf pain/heaviness over the past few years that improves w/ rest. She denied any palpitations, dizziness, syncope, diaphoresis, fatigue, LE edema, orthopnea, PND, nausea/vomiting, fever, chills or recent sick contact. Most recent Cardiac Cath (09/11/2020 at [facility]) revealed LM normal, mid RCA 80-90% s/p NADIA, distal RCA mild diffuse, prox LAD mild diffuse, mid LAD <30%, distal LCx mild diffuse, OM3 80-90%. Patient previously advised for staged PCI of LCx, however had been  postponed due to frequent admissions for bradycardia/diarrhea/CHF exacerbation. Echocardiogram (02/09/2021) showed EF 55%, mild MR, and mild TR. In light of patient's risk factors, CCS class III anginal symptoms, and known residual CAD, patient presented to [facility] for staged PCI.    Patient is now s/p cardiac catheterization w/ PTCA/NADIA distal LCx. Right ulnar access was utilized and ulnar band was eventually removed appropriately and w/o complications. Repeat EKG was w/o ischemic changes and no significant events were noted overnight on telemetry. Labs were reviewed, noted with hypomagnesemia (Mg 1.4); repleted with Magnesium Sulfate 2 grams IV x 1 dose, otherwise normal.  Pt is to continue Plavix 75mg and Xarelto 20mg daily (ASA discontinued as triple therapy is not recommended at this time per Dr. Sapp).  Patient has now been medically cleared for discharge as per Dr. Corrigan with recs to follow up with Dr. Turner in one week for post discharge check up and to repeat magnesium level.   Patient has been given appropriate discharge instructions including medication regimen, access site management, and follow up. Prescriptions the patient requires refills on have been e-prescribed to patient's preferred pharmacy.     VS Stable   Gen: NAD, A&O x3  Cards: RRR, clear S1 and S2 without murmur  Pulm: CTA B/L without w/r/r  Right Ulnar: No hematoma or ooze, peripheral pulses 2+ B/L  Abd: soft, NT  Ext: no LE edema or ulcerations B/L           Cardiac Rehab (Post PCI):            - Education on benefits of Cardiac Rehab provided to patient: Yes         - Referral and Prescription Given for Cardiac Rehab: Yes         - Patient given list of locations & instructed to contact their insurance company to review list of participating providers    Statin Prescribed (PCI this admission): Yes   DAPT: Plavix/Xarelto e-prescribed to patient's preferred pharmacy       70 y/o female, former smoker, w/ PMHx HTN, HLD, CAD (c/b NSTEMI 09/11/2020 s/p PCI w/ NADIA mRCA w/ residual OM3 80-90%), Ischemic cardiomyopathy (now recovered EF from 35% to 55%), 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 Liz Turner, c/o gradually worsening intermittent substernal, non-radiating, chest pressure occurring w/ moderate exertion and relieved w/ rest over the past few weeks. Patient also reports MELO w/ light physical exertion and B/L moderate calf pain/heaviness over the past few years that improves w/ rest. She denied any palpitations, dizziness, syncope, diaphoresis, fatigue, LE edema, orthopnea, PND, nausea/vomiting, fever, chills or recent sick contact. Most recent Cardiac Cath (09/11/2020 at [facility]) revealed LM normal, mid RCA 80-90% s/p NADIA, distal RCA mild diffuse, prox LAD mild diffuse, mid LAD <30%, distal LCx mild diffuse, OM3 80-90%. Patient previously advised for staged PCI of LCx, however had been  postponed due to frequent admissions for bradycardia/diarrhea/CHF exacerbation. Echocardiogram (02/09/2021) showed EF 55%, mild MR, and mild TR. In light of patient's risk factors, CCS class III anginal symptoms, and known residual CAD, patient presented to [facility] for staged PCI.    Patient is now s/p cardiac catheterization w/ PTCA/NADIA distal LCx. Right ulnar access was utilized and ulnar band was eventually removed appropriately and w/o complications. Repeat EKG was w/o ischemic changes and no significant events were noted overnight on telemetry. Labs were reviewed, noted with hypomagnesemia (Mg 1.4); repleted with Magnesium Sulfate 2 grams IV x 1 dose, otherwise normal.  Pt is to continue Plavix 75mg and Xarelto 20mg daily (ASA discontinued as triple therapy is not recommended at this time per Dr. Sapp).  Patient has now been medically cleared for discharge as per Dr. Corrigan with recs to follow up with Dr. Turner in one week for post discharge check up and to repeat magnesium level.   Patient has been given appropriate discharge instructions including medication regimen, access site management, and follow up. Prescriptions the patient requires refills on have been e-prescribed to patient's preferred pharmacy.     VS Stable   Gen: NAD, A&O x3  Cards: RRR, clear S1 and S2 without murmur  Pulm: CTA B/L without w/r/r  Right Ulnar: No hematoma or ooze, peripheral pulses 2+ B/L  Abd: soft, NT  Ext: no LE edema or ulcerations B/L           Cardiac Rehab (Post PCI):            - Education on benefits of Cardiac Rehab provided to patient: Yes         - Referral and Prescription Given for Cardiac Rehab: Yes         - Patient given list of locations & instructed to contact their insurance company to review list of participating providers    Statin Prescribed (PCI this admission): Yes   DAPT: Plavix/Xarelto e-prescribed to patient's preferred pharmacy

## 2021-06-09 NOTE — DISCHARGE NOTE PROVIDER - NSDCFUADDINST_GEN_ALL_CORE_FT
No heavy lifting, strenuous activity, bending, straining or unnecessary stair climbing  for 2 weeks. No sex for 1 week.  No driving for 2 days. You may shower 24 hours following procedure but avoid baths and swimming for 1 week. Check right wrist access site for bleeding and/or swelling daily following procedure. Call your doctor/cardiologist immediately should it occur or if you have increased/persistent pain at the site. Follow up with your cardiologist in 1- 2 weeks. You may call [facility] Cardiovascular unit at  [phone] after office hours and weekends  with any questions or concerns following your procedure. Take medications as prescribed.

## 2021-06-10 ENCOUNTER — TRANSCRIPTION ENCOUNTER (OUTPATIENT)
Age: 71
End: 2021-06-10

## 2021-06-10 VITALS — TEMPERATURE: 98 F

## 2021-06-10 LAB
ANION GAP SERPL CALC-SCNC: 8 MMOL/L — SIGNIFICANT CHANGE UP (ref 5–17)
BUN SERPL-MCNC: 11 MG/DL — SIGNIFICANT CHANGE UP (ref 7–23)
CALCIUM SERPL-MCNC: 8.7 MG/DL — SIGNIFICANT CHANGE UP (ref 8.4–10.5)
CHLORIDE SERPL-SCNC: 105 MMOL/L — SIGNIFICANT CHANGE UP (ref 96–108)
CO2 SERPL-SCNC: 29 MMOL/L — SIGNIFICANT CHANGE UP (ref 22–31)
CREAT SERPL-MCNC: 0.75 MG/DL — SIGNIFICANT CHANGE UP (ref 0.5–1.3)
GLUCOSE BLDC GLUCOMTR-MCNC: 126 MG/DL — HIGH (ref 70–99)
GLUCOSE SERPL-MCNC: 120 MG/DL — HIGH (ref 70–99)
HCT VFR BLD CALC: 34 % — LOW (ref 34.5–45)
HGB BLD-MCNC: 11.2 G/DL — LOW (ref 11.5–15.5)
MAGNESIUM SERPL-MCNC: 1.4 MG/DL — LOW (ref 1.6–2.6)
MCHC RBC-ENTMCNC: 29.6 PG — SIGNIFICANT CHANGE UP (ref 27–34)
MCHC RBC-ENTMCNC: 32.9 GM/DL — SIGNIFICANT CHANGE UP (ref 32–36)
MCV RBC AUTO: 89.7 FL — SIGNIFICANT CHANGE UP (ref 80–100)
NRBC # BLD: 0 /100 WBCS — SIGNIFICANT CHANGE UP (ref 0–0)
PLATELET # BLD AUTO: 167 K/UL — SIGNIFICANT CHANGE UP (ref 150–400)
POTASSIUM SERPL-MCNC: 4 MMOL/L — SIGNIFICANT CHANGE UP (ref 3.5–5.3)
POTASSIUM SERPL-SCNC: 4 MMOL/L — SIGNIFICANT CHANGE UP (ref 3.5–5.3)
RBC # BLD: 3.79 M/UL — LOW (ref 3.8–5.2)
RBC # FLD: 15.5 % — HIGH (ref 10.3–14.5)
SODIUM SERPL-SCNC: 142 MMOL/L — SIGNIFICANT CHANGE UP (ref 135–145)
WBC # BLD: 4.82 K/UL — SIGNIFICANT CHANGE UP (ref 3.8–10.5)
WBC # FLD AUTO: 4.82 K/UL — SIGNIFICANT CHANGE UP (ref 3.8–10.5)

## 2021-06-10 PROCEDURE — 80061 LIPID PANEL: CPT

## 2021-06-10 PROCEDURE — C1753: CPT

## 2021-06-10 PROCEDURE — C1887: CPT

## 2021-06-10 PROCEDURE — 83735 ASSAY OF MAGNESIUM: CPT

## 2021-06-10 PROCEDURE — 93005 ELECTROCARDIOGRAM TRACING: CPT

## 2021-06-10 PROCEDURE — C1874: CPT

## 2021-06-10 PROCEDURE — 85025 COMPLETE CBC W/AUTO DIFF WBC: CPT

## 2021-06-10 PROCEDURE — 82550 ASSAY OF CK (CPK): CPT

## 2021-06-10 PROCEDURE — 93010 ELECTROCARDIOGRAM REPORT: CPT

## 2021-06-10 PROCEDURE — C1725: CPT

## 2021-06-10 PROCEDURE — 80053 COMPREHEN METABOLIC PANEL: CPT

## 2021-06-10 PROCEDURE — 82553 CREATINE MB FRACTION: CPT

## 2021-06-10 PROCEDURE — C1769: CPT

## 2021-06-10 PROCEDURE — 85027 COMPLETE CBC AUTOMATED: CPT

## 2021-06-10 PROCEDURE — 82962 GLUCOSE BLOOD TEST: CPT

## 2021-06-10 PROCEDURE — C1894: CPT

## 2021-06-10 PROCEDURE — 80048 BASIC METABOLIC PNL TOTAL CA: CPT

## 2021-06-10 PROCEDURE — 83036 HEMOGLOBIN GLYCOSYLATED A1C: CPT

## 2021-06-10 RX ORDER — CLOPIDOGREL BISULFATE 75 MG/1
1 TABLET, FILM COATED ORAL
Qty: 30 | Refills: 11
Start: 2021-06-10 | End: 2022-06-04

## 2021-06-10 RX ORDER — ASPIRIN/CALCIUM CARB/MAGNESIUM 324 MG
1 TABLET ORAL
Qty: 0 | Refills: 0 | DISCHARGE

## 2021-06-10 RX ORDER — CARVEDILOL PHOSPHATE 80 MG/1
1 CAPSULE, EXTENDED RELEASE ORAL
Qty: 60 | Refills: 3
Start: 2021-06-10 | End: 2021-10-07

## 2021-06-10 RX ORDER — ATORVASTATIN CALCIUM 80 MG/1
1 TABLET, FILM COATED ORAL
Qty: 0 | Refills: 0 | DISCHARGE

## 2021-06-10 RX ORDER — CARVEDILOL PHOSPHATE 80 MG/1
1 CAPSULE, EXTENDED RELEASE ORAL
Qty: 0 | Refills: 0 | DISCHARGE

## 2021-06-10 RX ORDER — ATORVASTATIN CALCIUM 80 MG/1
1 TABLET, FILM COATED ORAL
Qty: 30 | Refills: 11
Start: 2021-06-10 | End: 2022-06-04

## 2021-06-10 RX ORDER — MAGNESIUM SULFATE 500 MG/ML
2 VIAL (ML) INJECTION ONCE
Refills: 0 | Status: COMPLETED | OUTPATIENT
Start: 2021-06-10 | End: 2021-06-10

## 2021-06-10 RX ADMIN — GABAPENTIN 600 MILLIGRAM(S): 400 CAPSULE ORAL at 05:26

## 2021-06-10 RX ADMIN — CARVEDILOL PHOSPHATE 12.5 MILLIGRAM(S): 80 CAPSULE, EXTENDED RELEASE ORAL at 05:26

## 2021-06-10 RX ADMIN — Medication 50 GRAM(S): at 06:13

## 2021-06-10 NOTE — DISCHARGE NOTE NURSING/CASE MANAGEMENT/SOCIAL WORK - PATIENT PORTAL LINK FT
You can access the FollowMyHealth Patient Portal offered by [facility] by registering at the following website: [email or web address]. By joining [facility]’s FollowMyHealth portal, you will also be able to view your health information using other applications (apps) compatible with our system.

## 2022-08-08 NOTE — ED ADULT TRIAGE NOTE - DOMESTIC TRAVEL HIGH RISK QUESTION
From: Miguel Lemon  To: Brit Hope MD  Sent: 8/8/2022 9:09 AM CDT  Subject: trulicity    I think I need a stronger dose My sugar is very high during the day. In the 300's. Let me know. No